# Patient Record
Sex: FEMALE | Race: WHITE
[De-identification: names, ages, dates, MRNs, and addresses within clinical notes are randomized per-mention and may not be internally consistent; named-entity substitution may affect disease eponyms.]

---

## 2017-12-20 ENCOUNTER — HOSPITAL ENCOUNTER (EMERGENCY)
Dept: HOSPITAL 45 - C.EDB | Age: 32
Discharge: HOME | End: 2017-12-20
Payer: COMMERCIAL

## 2017-12-20 VITALS
OXYGEN SATURATION: 99 % | HEART RATE: 91 BPM | DIASTOLIC BLOOD PRESSURE: 82 MMHG | TEMPERATURE: 98.6 F | SYSTOLIC BLOOD PRESSURE: 110 MMHG

## 2017-12-20 VITALS
WEIGHT: 103.18 LBS | HEIGHT: 59.02 IN | BODY MASS INDEX: 20.8 KG/M2 | HEIGHT: 59.02 IN | WEIGHT: 103.18 LBS | BODY MASS INDEX: 20.8 KG/M2

## 2017-12-20 DIAGNOSIS — R10.2: ICD-10-CM

## 2017-12-20 DIAGNOSIS — M54.2: ICD-10-CM
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
## 2017-12-20 DIAGNOSIS — S09.90XA: Primary | ICD-10-CM

## 2017-12-20 DIAGNOSIS — F17.200: ICD-10-CM
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
## 2018-04-18 ENCOUNTER — HOSPITAL ENCOUNTER (EMERGENCY)
Dept: HOSPITAL 45 - C.EDB | Age: 33
Discharge: HOME | End: 2018-04-18
Payer: COMMERCIAL

## 2018-04-18 VITALS — OXYGEN SATURATION: 100 % | SYSTOLIC BLOOD PRESSURE: 105 MMHG | HEART RATE: 62 BPM | DIASTOLIC BLOOD PRESSURE: 63 MMHG

## 2018-04-18 VITALS
HEIGHT: 59.02 IN | BODY MASS INDEX: 23.11 KG/M2 | HEIGHT: 59.02 IN | BODY MASS INDEX: 23.11 KG/M2 | WEIGHT: 114.64 LBS | WEIGHT: 114.64 LBS

## 2018-04-18 VITALS — TEMPERATURE: 98.42 F

## 2018-04-18 DIAGNOSIS — F17.200: ICD-10-CM

## 2018-04-18 DIAGNOSIS — J06.9: Primary | ICD-10-CM
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
## 2018-04-18 NOTE — DIAGNOSTIC IMAGING REPORT
CHEST 2 VIEWS ROUTINE



HISTORY:  32 years-old Female COUGH acute cough with sore throat



COMPARISON: None available



TECHNIQUE: PA and lateral views of the chest



FINDINGS: 

Cardiomediastinal and hilar silhouettes are within normal limits. There is no

pneumothorax, pleural effusion, focal airspace consolidation or overt pulmonary

edema. Bones of the chest appear grossly intact. Cholecystectomy clips noted.



IMPRESSION: No acute process. 







The above report was generated using voice recognition software. It may contain

grammatical, syntax or spelling errors.







Electronically signed by:  Jae Parsons M.D.

4/18/2018 11:49 AM



Dictated Date/Time:  4/18/2018 11:48 AM